# Patient Record
Sex: MALE | Race: WHITE | HISPANIC OR LATINO | Employment: FULL TIME | URBAN - METROPOLITAN AREA
[De-identification: names, ages, dates, MRNs, and addresses within clinical notes are randomized per-mention and may not be internally consistent; named-entity substitution may affect disease eponyms.]

---

## 2019-05-12 ENCOUNTER — HOSPITAL ENCOUNTER (EMERGENCY)
Facility: OTHER | Age: 39
Discharge: HOME | End: 2019-05-12
Attending: EMERGENCY MEDICINE

## 2019-05-12 VITALS
OXYGEN SATURATION: 98 % | DIASTOLIC BLOOD PRESSURE: 79 MMHG | WEIGHT: 180 LBS | HEART RATE: 67 BPM | SYSTOLIC BLOOD PRESSURE: 133 MMHG | HEIGHT: 66 IN | BODY MASS INDEX: 28.93 KG/M2 | TEMPERATURE: 98 F | RESPIRATION RATE: 18 BRPM

## 2019-05-12 DIAGNOSIS — T14.90XA TRAUMA: ICD-10-CM

## 2019-05-12 DIAGNOSIS — S42.101A CLOSED FRACTURE OF RIGHT SCAPULA, UNSPECIFIED PART OF SCAPULA, INITIAL ENCOUNTER: Primary | ICD-10-CM

## 2019-05-12 PROCEDURE — 25000003 PHARM REV CODE 250: Performed by: EMERGENCY MEDICINE

## 2019-05-12 PROCEDURE — 63600175 PHARM REV CODE 636 W HCPCS: Performed by: EMERGENCY MEDICINE

## 2019-05-12 PROCEDURE — 96372 THER/PROPH/DIAG INJ SC/IM: CPT

## 2019-05-12 PROCEDURE — 99284 EMERGENCY DEPT VISIT MOD MDM: CPT | Mod: 25

## 2019-05-12 RX ORDER — KETOROLAC TROMETHAMINE 30 MG/ML
30 INJECTION, SOLUTION INTRAMUSCULAR; INTRAVENOUS
Status: COMPLETED | OUTPATIENT
Start: 2019-05-12 | End: 2019-05-12

## 2019-05-12 RX ORDER — OXYCODONE AND ACETAMINOPHEN 5; 325 MG/1; MG/1
1 TABLET ORAL EVERY 6 HOURS PRN
Qty: 15 TABLET | Refills: 0 | Status: SHIPPED | OUTPATIENT
Start: 2019-05-12

## 2019-05-12 RX ORDER — OXYCODONE AND ACETAMINOPHEN 5; 325 MG/1; MG/1
1 TABLET ORAL
Status: COMPLETED | OUTPATIENT
Start: 2019-05-12 | End: 2019-05-12

## 2019-05-12 RX ADMIN — KETOROLAC TROMETHAMINE 30 MG: 30 INJECTION, SOLUTION INTRAMUSCULAR at 12:05

## 2019-05-12 RX ADMIN — OXYCODONE HYDROCHLORIDE AND ACETAMINOPHEN 1 TABLET: 5; 325 TABLET ORAL at 01:05

## 2019-05-12 NOTE — ED NOTES
Pt to ED with c/o R shoulder pain. Pt experienced fall out of the bed of a truck yesterday, landed on R shoulder. Pt reporting worsening pain to R shoulder since incident. Pt arrives to ED with sling to RUE already in place. Unable to assess ROM due to pt reluctance r/t pain. No obvious swelling or deformity to RUE. TTP to R scapula. Pt denies LOC, N/V, changes in vision. Pt is Mexican-speaking only, MARTII used for all interpretation. AAOx4, RR even and unlabored. Will continue to monitor.     Two patient identifiers have been checked and are correct.      Appearance: Pt awake, alert & oriented to person, place & time. Pt in no acute distress at present time. Pt is clean and well groomed with clothes appropriately fastened.   Skin: Skin warm, dry & intact. Color consistent with ethnicity. Mucous membranes moist. No breakdown or brusing noted.   Musculoskeletal: SEE HPI/ Patient moving all  Other extremities well, no obvious swelling or deformities noted.   Respiratory: Repirations spontaneous, even, and non-labored. Visible chest rise noted. Airway is open and patent. No accessory muscle use noted.   Neurologic: Sensation is intact. Speech is clear and appropriate. Eyes open spontaneously, behavior appropriate to situation, follows commands, facial expression symmetrical, bilateral hand grasp equal and even, purposeful motor response noted.  Cardiac: All peripheral pulses present. No Bilateral lower extremity edema. Cap refill is <3 seconds.  Abdomen: Abdomen soft, non-tender to palpation.   : Pt reports no dysuria or hematuria.

## 2019-05-12 NOTE — ED PROVIDER NOTES
Encounter Date: 5/12/2019    SCRIBE #1 NOTE: I, Cyndy Wiliam, am scribing for, and in the presence of, Dr. Maguire.       History     Chief Complaint   Patient presents with    Fall     pt was in the bed of truck x yesterday, was working in tool box. reporting fall. reporting pain to R shoulder upper arm since accident.      Time seen by provider: 11:44 AM    This is a 39 y.o. right hand dominant male who presents with complaint of right shoulder pain s/p fall yesterday evening. Pt states that he fell off of a truck, bracing himself with his right hand. Pt states that when he landed he felt like something pulled in the shoulder. Pt denies having struck the shoulder. He denies any pain to the right hand or wrist. He reports no head trauma, neck pain, back pain, dizziness, headaches, numbness, weakness, tingling, LOC, or wound. Pt has been taking Aleve with minimal relief, last taken yesterday evening. He bought a sling at [facility], which he has been using since yesterday evening. Pt denies any prior shoulder injury or surgery. He denies smoking, drinking, and illicit drug use. He has no major medical problems and denies taking any daily medications.    The history is provided by the patient. A  was used.     Review of patient's allergies indicates:  No Known Allergies  History reviewed. No pertinent past medical history.  Past Surgical History:   Procedure Laterality Date    APPENDECTOMY       No family history on file.  Social History     Tobacco Use    Smoking status: Never Smoker    Smokeless tobacco: Never Used   Substance Use Topics    Alcohol use: Never     Frequency: Never    Drug use: Never     Review of Systems   Constitutional: Negative for chills and fever.   HENT: Negative for congestion, rhinorrhea and sore throat.    Respiratory: Negative for cough and shortness of breath.    Cardiovascular: Negative for chest pain.   Gastrointestinal: Negative for abdominal pain,  diarrhea, nausea and vomiting.   Endocrine: Negative for polyuria.   Genitourinary: Negative for decreased urine volume and dysuria.   Musculoskeletal: Positive for arthralgias (right shoulder). Negative for back pain, joint swelling and neck pain.   Skin: Negative for rash and wound.   Allergic/Immunologic: Negative for immunocompromised state.   Neurological: Negative for dizziness, syncope, weakness, light-headedness, numbness and headaches.        Negative for tingling.   Hematological: Does not bruise/bleed easily.   Psychiatric/Behavioral: Negative for confusion.       Physical Exam     Initial Vitals [05/12/19 1114]   BP Pulse Resp Temp SpO2   (!) 142/77 79 16 99.2 °F (37.3 °C) 98 %      MAP       --         Physical Exam    Nursing note and vitals reviewed.  Constitutional: He appears well-developed and well-nourished. He is not diaphoretic. No distress.   HENT:   Head: Normocephalic and atraumatic.   Right Ear: External ear normal.   Left Ear: External ear normal.   Eyes: Conjunctivae and EOM are normal. Pupils are equal, round, and reactive to light.   Neck: Normal range of motion. Neck supple.   Cardiovascular: Normal rate, regular rhythm and normal heart sounds.   Pulses:       Radial pulses are 2+ on the right side, and 2+ on the left side.   Normal S1, S2. No murmurs, no rubs, no gallops.   Pulmonary/Chest: Breath sounds normal. No respiratory distress. He has no wheezes. He has no rhonchi. He has no rales.   Abdominal: Soft. Bowel sounds are normal. He exhibits no distension. There is no tenderness. There is no rebound and no guarding.   Musculoskeletal: He exhibits no edema.   Tenderness over the right suprascapular margin. Pain with abduction, adduction, flexion, and extension of shoulder. No tenderness over the AC joint. No deformity, crepitus, or step-offs.   Lymphadenopathy:     He has no cervical adenopathy.   Neurological: He is alert and oriented to person, place, and time. He has normal  strength. No sensory deficit.   Skin: Skin is warm and dry. No rash noted. No erythema.   Psychiatric: He has a normal mood and affect. His behavior is normal. Thought content normal.         ED Course   Orthopedic Injury  Date/Time: 5/12/2019 1:24 PM  Performed by: Reed Lafleur MD  Authorized by: Reed Lafleur MD     Location procedure was performed:  [facility] EMERGENCY DEPARTMENT  Consent Done?:  Not Needed  Injury:     Injury location:  Shoulder    Injury type:  Fracture    Fracture type: scapular        Pre-procedure assessment:     Neurovascular status: Neurovascularly intact      Distal perfusion: normal      Neurological function: normal      Range of motion: normal      Local anesthesia used?: No      Patient sedated?: No        Selections made in this section will also lock the Injury type section above.:     Manipulation performed?: No      Immobilization:  Sling (Sling and swath)    Supplies used: Sling and swath with Velcro.    Complications: No      Specimens: No    Post-procedure assessment:     Neurovascular status: Neurovascularly intact      Distal perfusion: normal      Neurological function: normal      Range of motion: normal      Patient tolerance:  Patient tolerated the procedure well with no immediate complications      Labs Reviewed - No data to display       Imaging Results          X-Ray Shoulder Trauma Right (Final result)  Result time 05/12/19 13:09:11    Final result by Atif Rubio MD (05/12/19 13:09:11)                 Impression:      Acute nondisplaced fracture of the scapula.      Electronically signed by: Atif Rubio MD  Date:    05/12/2019  Time:    13:09             Narrative:    EXAMINATION:  XR SHOULDER TRAUMA 3 VIEW RIGHT    CLINICAL HISTORY:  Injury, unspecified, initial encounter    TECHNIQUE:  Three or four views of the right shoulder were performed.    COMPARISON:  None    FINDINGS:  There is an acute nondisplaced fracture of the scapula.  Remaining  osseous structures are intact.  No evidence of dislocation.                              X-Rays:   Independently Interpreted Readings:   Other Readings:  X-Ray Scapula Right: Scapular fracture.    Medical Decision Making:   Independently Interpreted Test(s):   I have ordered and independently interpreted X-rays - see prior notes.  Clinical Tests:   Radiological Study: Ordered and Reviewed            Scribe Attestation:   Scribe #1: I performed the above scribed service and the documentation accurately describes the services I performed. I attest to the accuracy of the note.    Attending Attestation:           Physician Attestation for Scribe:  Physician Attestation Statement for Scribe #1: I, Dr. Lafleur, reviewed documentation, as scribed by Cyndy Swain in my presence, and it is both accurate and complete.         Attending ED Notes:   Emergent evaluation a 38-year-old male with pain to the right scapula status post trauma.  Patient is neurovascularly intact without focal neurologic deficits.  X-ray reveals acute nondisplaced fracture of the scapula.  The patient is extensively counseled on his diagnosis and treatment including all diagnostic and physical exam findings.  The patient is placed in a sling and swath splint and is neurovascularly intact pre and post splint placement.  The patient's directed follow-up with Orthopedics in the next 24-48 hours.             Clinical Impression:     1. Closed fracture of right scapula, unspecified part of scapula, initial encounter    2. Trauma                                 Reed Lafleur MD  05/12/19 2103